# Patient Record
Sex: FEMALE | Race: OTHER | URBAN - METROPOLITAN AREA
[De-identification: names, ages, dates, MRNs, and addresses within clinical notes are randomized per-mention and may not be internally consistent; named-entity substitution may affect disease eponyms.]

---

## 2023-05-02 ENCOUNTER — PREPPED CHART (OUTPATIENT)
Dept: URBAN - METROPOLITAN AREA CLINIC 52 | Facility: CLINIC | Age: 73
End: 2023-05-02

## 2023-05-11 ENCOUNTER — NEW PATIENT (OUTPATIENT)
Dept: URBAN - METROPOLITAN AREA CLINIC 52 | Facility: CLINIC | Age: 73
End: 2023-05-11

## 2023-05-11 DIAGNOSIS — H35.033: ICD-10-CM

## 2023-05-11 DIAGNOSIS — H43.813: ICD-10-CM

## 2023-05-11 DIAGNOSIS — H26.492: ICD-10-CM

## 2023-05-11 DIAGNOSIS — H35.372: ICD-10-CM

## 2023-05-11 PROCEDURE — 92004 COMPRE OPH EXAM NEW PT 1/>: CPT

## 2023-05-11 PROCEDURE — 92134 CPTRZ OPH DX IMG PST SGM RTA: CPT

## 2023-05-11 PROCEDURE — 66821 AFTER CATARACT LASER SURGERY: CPT

## 2023-05-11 RX ORDER — PREDNISOLONE ACETATE 10 MG/ML: 1 SUSPENSION/ DROPS OPHTHALMIC TWICE A DAY

## 2023-05-11 ASSESSMENT — VISUAL ACUITY
OU_SC: 20/40
OD_CC: J1
OD_CC: 20/25
OS_SC: J20
OD_SC: 20/40
OS_CC: 20/60-1
OU_CC: 20/25
OS_SC: 20/60-2
OD_GLARE: 20/25
OS_CC: J1
OD_SC: J20
OU_SC: J20
OU_CC: J1
OD_GLARE: 20/30

## 2023-05-11 ASSESSMENT — TONOMETRY
OD_IOP_MMHG: 13
OS_IOP_MMHG: 13

## 2023-05-11 ASSESSMENT — KERATOMETRY
OD_AXISANGLE_DEGREES: 173
OD_AXISANGLE2_DEGREES: 83
OD_K1POWER_DIOPTERS: 42.50
OD_K2POWER_DIOPTERS: 42.75

## 2023-06-08 ENCOUNTER — POST-OP (OUTPATIENT)
Dept: URBAN - METROPOLITAN AREA CLINIC 52 | Facility: CLINIC | Age: 73
End: 2023-06-08

## 2023-06-08 DIAGNOSIS — Z98.890: ICD-10-CM

## 2023-06-08 PROCEDURE — 99024 POSTOP FOLLOW-UP VISIT: CPT

## 2023-06-08 PROCEDURE — 92015GRNC REFRACTION GLASSES RECHECK NO CHARGE

## 2023-06-08 ASSESSMENT — TONOMETRY
OD_IOP_MMHG: 12
OS_IOP_MMHG: 12

## 2023-06-08 ASSESSMENT — KERATOMETRY
OD_AXISANGLE2_DEGREES: 83
OD_K2POWER_DIOPTERS: 42.75
OD_K1POWER_DIOPTERS: 42.50
OD_AXISANGLE_DEGREES: 173

## 2023-06-08 ASSESSMENT — VISUAL ACUITY
OS_CC: 20/40
OS_PH: 20/40
OU_CC: J1+
OD_CC: 20/20

## 2023-06-22 ENCOUNTER — EMERGENCY VISIT (OUTPATIENT)
Dept: URBAN - METROPOLITAN AREA CLINIC 49 | Facility: CLINIC | Age: 73
End: 2023-06-22

## 2023-06-22 DIAGNOSIS — H35.372: ICD-10-CM

## 2023-06-22 PROCEDURE — 92134 CPTRZ OPH DX IMG PST SGM RTA: CPT

## 2023-06-22 PROCEDURE — 92012 INTRM OPH EXAM EST PATIENT: CPT

## 2023-06-22 ASSESSMENT — KERATOMETRY
OD_K1POWER_DIOPTERS: 42.50
OD_K2POWER_DIOPTERS: 42.75
OD_AXISANGLE2_DEGREES: 83
OD_AXISANGLE_DEGREES: 173

## 2023-06-22 ASSESSMENT — TONOMETRY
OD_IOP_MMHG: 12
OS_IOP_MMHG: 12

## 2023-06-22 ASSESSMENT — VISUAL ACUITY: OD_CC: 20/20-1

## 2025-08-03 NOTE — PROGRESS NOTES
"[facility]: [facility]  PROGRESS NOTE      Dionne Bautista is a 75 y.o. female that is presenting today fto establish care with new provider.  Would like to discuss joint issues and ozempic    Assessment/Plan   {Assess/Plan SmartLinks (Optional):81933}  Subjective   HPI  Review of Systems   Objective   There were no vitals filed for this visit.   There is no height or weight on file to calculate BMI.  Physical Exam  Diagnostic Results   No results found for: \"GLUCOSE\", \"CALCIUM\", \"NA\", \"K\", \"CO2\", \"CL\", \"BUN\", \"CREATININE\"  No results found for: \"ALT\", \"AST\", \"GGT\", \"ALKPHOS\", \"BILITOT\"  No results found for: \"WBC\", \"HGB\", \"HCT\", \"MCV\", \"PLT\"  No results found for: \"CHOL\"  No results found for: \"HDL\"  No results found for: \"LDLCALC\"  No results found for: \"TRIG\"  No components found for: \"CHOLHDL\"  No results found for: \"HGBA1C\"  Other labs not included in the list above were reviewed either before or during this encounter.    History    Medical History[1]  Surgical History[2]  Family History[3]  Social History     Socioeconomic History    Marital status:      Spouse name: Not on file    Number of children: Not on file    Years of education: Not on file    Highest education level: Not on file   Occupational History    Not on file   Tobacco Use    Smoking status: Not on file    Smokeless tobacco: Not on file   Substance and Sexual Activity    Alcohol use: Not on file    Drug use: Not on file    Sexual activity: Not on file   Other Topics Concern    Not on file   Social History Narrative    Not on file     Social Drivers of Health     Financial Resource Strain: Not on file   Food Insecurity: Not on file   Transportation Needs: Not on file   Physical Activity: Not on file   Stress: Not on file   Social Connections: Not on file   Intimate Partner Violence: Not on file   Housing Stability: Not on file     Allergies[4]  Medications Ordered Prior to Encounter[5]    There is no immunization " history on file for this patient.  Patient's medical history was reviewed and updated either before or during this encounter.       Radha Vigil, APRN-CNP       [1] No past medical history on file.  [2] No past surgical history on file.  [3] No family history on file.  [4] Not on File  [5]   No current outpatient medications on file prior to visit.     No current facility-administered medications on file prior to visit.

## 2025-08-04 ENCOUNTER — TELEPHONE (OUTPATIENT)
Dept: PRIMARY CARE | Facility: CLINIC | Age: 75
End: 2025-08-04
Payer: MEDICARE

## 2025-08-04 NOTE — TELEPHONE ENCOUNTER
LMOVM for pt to contact office directly to rs new pt appt. Appt booked by CS was not available for new patient appt.

## 2025-08-05 ENCOUNTER — OFFICE VISIT (OUTPATIENT)
Dept: PRIMARY CARE | Facility: CLINIC | Age: 75
End: 2025-08-05
Payer: MEDICARE

## 2025-08-05 ENCOUNTER — APPOINTMENT (OUTPATIENT)
Dept: PRIMARY CARE | Facility: CLINIC | Age: 75
End: 2025-08-05
Payer: MEDICARE

## 2025-08-05 ENCOUNTER — HOSPITAL ENCOUNTER (OUTPATIENT)
Dept: RADIOLOGY | Facility: CLINIC | Age: 75
Discharge: HOME | End: 2025-08-05
Payer: MEDICARE

## 2025-08-05 VITALS
SYSTOLIC BLOOD PRESSURE: 107 MMHG | TEMPERATURE: 97.8 F | HEART RATE: 58 BPM | WEIGHT: 164 LBS | DIASTOLIC BLOOD PRESSURE: 65 MMHG | BODY MASS INDEX: 26.36 KG/M2 | OXYGEN SATURATION: 96 % | HEIGHT: 66 IN

## 2025-08-05 DIAGNOSIS — F32.A DEPRESSION, UNSPECIFIED DEPRESSION TYPE: ICD-10-CM

## 2025-08-05 DIAGNOSIS — Z76.89 ENCOUNTER TO ESTABLISH CARE: Primary | ICD-10-CM

## 2025-08-05 DIAGNOSIS — M79.672 LEFT FOOT PAIN: ICD-10-CM

## 2025-08-05 DIAGNOSIS — M18.11 ARTHRITIS OF CARPOMETACARPAL (CMC) JOINT OF RIGHT THUMB: ICD-10-CM

## 2025-08-05 DIAGNOSIS — K21.9 GASTROESOPHAGEAL REFLUX DISEASE WITHOUT ESOPHAGITIS: ICD-10-CM

## 2025-08-05 DIAGNOSIS — F32.1 MAJOR DEPRESSIVE DISORDER, SINGLE EPISODE, MODERATE (MULTI): ICD-10-CM

## 2025-08-05 DIAGNOSIS — Z76.89 ENCOUNTER TO ESTABLISH CARE WITH NEW PROVIDER: Primary | ICD-10-CM

## 2025-08-05 DIAGNOSIS — J45.20 MILD INTERMITTENT ASTHMA WITHOUT COMPLICATION (HHS-HCC): ICD-10-CM

## 2025-08-05 DIAGNOSIS — I10 PRIMARY HYPERTENSION: ICD-10-CM

## 2025-08-05 DIAGNOSIS — R73.01 IMPAIRED FASTING GLUCOSE: ICD-10-CM

## 2025-08-05 DIAGNOSIS — E03.9 ACQUIRED HYPOTHYROIDISM: ICD-10-CM

## 2025-08-05 PROBLEM — J45.909 ASTHMA: Status: ACTIVE | Noted: 2021-08-06

## 2025-08-05 PROCEDURE — 1036F TOBACCO NON-USER: CPT | Performed by: NURSE PRACTITIONER

## 2025-08-05 PROCEDURE — 99214 OFFICE O/P EST MOD 30 MIN: CPT | Performed by: NURSE PRACTITIONER

## 2025-08-05 PROCEDURE — 73630 X-RAY EXAM OF FOOT: CPT | Mod: LEFT SIDE | Performed by: RADIOLOGY

## 2025-08-05 PROCEDURE — 73140 X-RAY EXAM OF FINGER(S): CPT | Mod: RT

## 2025-08-05 PROCEDURE — 3078F DIAST BP <80 MM HG: CPT | Performed by: NURSE PRACTITIONER

## 2025-08-05 PROCEDURE — 1159F MED LIST DOCD IN RCRD: CPT | Performed by: NURSE PRACTITIONER

## 2025-08-05 PROCEDURE — 73140 X-RAY EXAM OF FINGER(S): CPT | Mod: RIGHT SIDE | Performed by: RADIOLOGY

## 2025-08-05 PROCEDURE — 99204 OFFICE O/P NEW MOD 45 MIN: CPT | Performed by: NURSE PRACTITIONER

## 2025-08-05 PROCEDURE — 73630 X-RAY EXAM OF FOOT: CPT | Mod: LT

## 2025-08-05 PROCEDURE — 3074F SYST BP LT 130 MM HG: CPT | Performed by: NURSE PRACTITIONER

## 2025-08-05 PROCEDURE — 1125F AMNT PAIN NOTED PAIN PRSNT: CPT | Performed by: NURSE PRACTITIONER

## 2025-08-05 RX ORDER — ESOMEPRAZOLE MAGNESIUM 40 MG/1
40 CAPSULE, DELAYED RELEASE ORAL
COMMUNITY

## 2025-08-05 RX ORDER — VENLAFAXINE HYDROCHLORIDE 150 MG/1
TABLET, EXTENDED RELEASE ORAL
COMMUNITY

## 2025-08-05 RX ORDER — FLUTICASONE PROPIONATE 50 MCG
SPRAY, SUSPENSION (ML) NASAL
COMMUNITY
Start: 2024-12-11

## 2025-08-05 RX ORDER — ALBUTEROL SULFATE 90 UG/1
INHALANT RESPIRATORY (INHALATION)
COMMUNITY

## 2025-08-05 RX ORDER — AMLODIPINE BESYLATE 5 MG/1
5 TABLET ORAL DAILY
COMMUNITY

## 2025-08-05 RX ORDER — HYDROCHLOROTHIAZIDE 25 MG/1
25 TABLET ORAL
COMMUNITY

## 2025-08-05 RX ORDER — VALACYCLOVIR HYDROCHLORIDE 500 MG/1
500 TABLET, FILM COATED ORAL DAILY
COMMUNITY

## 2025-08-05 RX ORDER — LEVOTHYROXINE, LIOTHYRONINE 19; 4.5 UG/1; UG/1
TABLET ORAL
COMMUNITY

## 2025-08-05 RX ORDER — MONTELUKAST SODIUM 5 MG/1
TABLET, CHEWABLE ORAL
COMMUNITY
Start: 2025-05-22

## 2025-08-05 RX ORDER — METOPROLOL SUCCINATE 100 MG/1
100 TABLET, EXTENDED RELEASE ORAL DAILY
COMMUNITY

## 2025-08-05 RX ORDER — BUDESONIDE AND FORMOTEROL FUMARATE DIHYDRATE 160; 4.5 UG/1; UG/1
AEROSOL RESPIRATORY (INHALATION) EVERY 12 HOURS
COMMUNITY

## 2025-08-05 RX ORDER — ROSUVASTATIN CALCIUM 10 MG/1
TABLET, COATED ORAL EVERY 24 HOURS
COMMUNITY
End: 2025-08-05 | Stop reason: ALTCHOICE

## 2025-08-05 RX ORDER — VENLAFAXINE HYDROCHLORIDE 75 MG/1
75 CAPSULE, EXTENDED RELEASE ORAL DAILY
Qty: 30 CAPSULE | Refills: 1 | Status: SHIPPED | OUTPATIENT
Start: 2025-08-05 | End: 2025-10-04

## 2025-08-05 RX ORDER — METFORMIN HYDROCHLORIDE 500 MG/1
TABLET ORAL EVERY 12 HOURS
COMMUNITY
End: 2025-08-05 | Stop reason: ALTCHOICE

## 2025-08-05 RX ORDER — BLOOD SUGAR DIAGNOSTIC
STRIP MISCELLANEOUS EVERY 8 HOURS
COMMUNITY
Start: 2024-06-10

## 2025-08-05 ASSESSMENT — ENCOUNTER SYMPTOMS
CONSTITUTIONAL NEGATIVE: 1
JOINT SWELLING: 1
CARDIOVASCULAR NEGATIVE: 1
RESPIRATORY NEGATIVE: 1
NEUROLOGICAL NEGATIVE: 1

## 2025-08-05 ASSESSMENT — PATIENT HEALTH QUESTIONNAIRE - PHQ9
SUM OF ALL RESPONSES TO PHQ9 QUESTIONS 1 AND 2: 0
1. LITTLE INTEREST OR PLEASURE IN DOING THINGS: NOT AT ALL
2. FEELING DOWN, DEPRESSED OR HOPELESS: NOT AT ALL
SUM OF ALL RESPONSES TO PHQ9 QUESTIONS 1 AND 2: 0
1. LITTLE INTEREST OR PLEASURE IN DOING THINGS: NOT AT ALL
2. FEELING DOWN, DEPRESSED OR HOPELESS: NOT AT ALL

## 2025-08-05 ASSESSMENT — PAIN SCALES - GENERAL: PAINLEVEL_OUTOF10: 7

## 2025-08-05 NOTE — ASSESSMENT & PLAN NOTE
Managed with venlafaxine 150mg daily  Will increase velafaxine to 225mg daily  Follow up in 6 weeks

## 2025-08-05 NOTE — ASSESSMENT & PLAN NOTE
Continue tylenol or motrin  Will notify patient of results  Orders:    XR foot left 1-2 views; Future     Size Of Lesion In Cm (Optional): 0.3 Detail Level: Detailed X Size Of Lesion In Cm (Optional): 0.5 Size Of Lesion In Cm (Optional): 0

## 2025-08-05 NOTE — ASSESSMENT & PLAN NOTE
Managed with amlodipine 5mg daily  Managed with hydrochlorothiazide 25mg daily  Managed with metoprolol xl 100mg daily  Bp stable

## 2025-08-05 NOTE — ASSESSMENT & PLAN NOTE
Has tried multiple medications for depression  Currently on venlafaxine and reports it is not working  Orders:    venlafaxine XR (Effexor-XR) 75 mg 24 hr capsule; Take 1 capsule (75 mg) by mouth once daily. Take with food.

## 2025-08-05 NOTE — ASSESSMENT & PLAN NOTE
Managed with albuterol inhaler  Managed with montelukast 5mg daily  Managed withg symbicort q12h  Stable no flares

## 2025-08-05 NOTE — PROGRESS NOTES
[facility]: [facility]  PROGRESS NOTE      Dionne Bautista is a 75 y.o. female that is presenting today fto establish care with new provider.  Would like to discuss joint issues and ozempic.  She last saw a pcp in [place] Dr. Tiffany Tinajero.   Is looking for another provider in [facility].  She has multiple concerns today.  She has swelling and pain of her right thumb.  States her thumb pops at times.  Does not get stuck.  Has pain at times - takes motrin which helps.  Has pain with movement at times.  Also c/o of pain at the base of her 2nd and 3rd toes on her left foot.  Unable to wear flip flops d/t the discomfort.  Denies any difficulty walking.  Also states her depression is getting worse - currently on venlafaxine but feels it is not working.  Reports trying other antidepressants but does not remember them.      Assessment/Plan   Assessment & Plan  Encounter to establish care with new provider  Medications and problem list reconciled today  Last mammogram 30 years ago - does not do mammograms  Does not want colorectal screening  Declined bone density  Will get a copy of recent lab work       Primary hypertension  Managed with amlodipine 5mg daily  Managed with hydrochlorothiazide 25mg daily  Managed with metoprolol xl 100mg daily  Bp stable      Major depressive disorder, single episode, moderate (Multi)  Managed with venlafaxine 150mg daily  Will increase velafaxine to 225mg daily  Follow up in 6 weeks       Mild intermittent asthma without complication ([facility]-HCC)  Managed with albuterol inhaler  Managed with montelukast 5mg daily  Managed withg symbicort q12h  Stable no flares       Acquired hypothyroidism  Managed with NP thyroid 30mg daily       Gastroesophageal reflux disease without esophagitis  Managed with Nexium 40mg       Impaired fasting glucose  Was unable to tolerate metformin       Arthritis of carpometacarpal (CMC) joint of right thumb  Referral to ortho hand       Depression,  "unspecified depression type  Has tried multiple medications for depression  Currently on venlafaxine and reports it is not working  Orders:    venlafaxine XR (Effexor-XR) 75 mg 24 hr capsule; Take 1 capsule (75 mg) by mouth once daily. Take with food.    Left foot pain  Continue tylenol or motrin  Will notify patient of results  Orders:    XR foot left 1-2 views; Future      Subjective   HPI  Review of Systems   Constitutional: Negative.    Respiratory: Negative.     Cardiovascular: Negative.    Musculoskeletal:  Positive for joint swelling.        See hpi for details  Right thumb and left foot pain   Skin:         Peeling skin on fingers   Neurological: Negative.    Psychiatric/Behavioral:          Depression      Objective   Vitals:    08/05/25 1344   BP: 107/65   Pulse: 58   Temp: 36.6 °C (97.8 °F)   SpO2: 96%      Body mass index is 26.47 kg/m².  Physical Exam  Vitals reviewed.   Constitutional:       Appearance: Normal appearance.     Cardiovascular:      Rate and Rhythm: Normal rate and regular rhythm.      Heart sounds: Normal heart sounds.   Pulmonary:      Breath sounds: Normal breath sounds.   Abdominal:      General: Bowel sounds are normal.      Palpations: Abdomen is soft.     Musculoskeletal:      Comments: Swelling and popping of right thumb joint  No pain with palpation to left foot no neuroma     Neurological:      General: No focal deficit present.      Mental Status: She is alert and oriented to person, place, and time.     Psychiatric:         Mood and Affect: Mood normal.         Behavior: Behavior normal.         Thought Content: Thought content normal.         Judgment: Judgment normal.       Diagnostic Results   No results found for: \"GLUCOSE\", \"CALCIUM\", \"NA\", \"K\", \"CO2\", \"CL\", \"BUN\", \"CREATININE\"  No results found for: \"ALT\", \"AST\", \"GGT\", \"ALKPHOS\", \"BILITOT\"  No results found for: \"WBC\", \"HGB\", \"HCT\", \"MCV\", \"PLT\"  No results found for: \"CHOL\"  No results found for: \"HDL\"  No results " "found for: \"LDLCALC\"  No results found for: \"TRIG\"  No components found for: \"CHOLHDL\"  No results found for: \"HGBA1C\"  Other labs not included in the list above were reviewed either before or during this encounter.    History    Medical History[1]  Surgical History[2]  Family History[3]  Social History     Socioeconomic History    Marital status:      Spouse name: Not on file    Number of children: Not on file    Years of education: Not on file    Highest education level: Not on file   Occupational History    Not on file   Tobacco Use    Smoking status: Never     Passive exposure: Never    Smokeless tobacco: Never   Vaping Use    Vaping status: Never Used   Substance and Sexual Activity    Alcohol use: Yes     Comment: social    Drug use: Never    Sexual activity: Yes     Partners: Male     Birth control/protection: Male Sterilization, Female Sterilization   Other Topics Concern    Not on file   Social History Narrative    Not on file     Social Drivers of Health     Financial Resource Strain: Not on file   Food Insecurity: No Food Insecurity (1/11/2024)    Received from [facility] ([place], [place], [place], [place])    Food Insecurity     Food run out past 12 months: Not on file     Food did not last past 12 months: Not on file   Transportation Needs: Not on file   Physical Activity: Not on file   Stress: Not on file   Social Connections: Low Risk  (1/11/2024)    Received from [facility] ([place], [place], [place], [place])    Family and Community Support     Help with Day to Day Activities: Not on file     Feeling Lonely or Isolated: Not on file   Intimate Partner Violence: Not on file   Housing Stability: Not on file     Allergies[4]  Medications Ordered Prior to Encounter[5]  Immunization History   Administered Date(s) Administered    Covid-19 Non-us Vaccine, Product Unknown 03/17/2021, 04/07/2021     Patient's medical history was reviewed and updated either before or during this encounter.       Radha Vigil, APRN-CNP     "     [1]   Past Medical History:  Diagnosis Date    Allergic 1981    Arthritis 2010    Asthma 1981    Cataract Surgery 2014    Depression 1996    Diabetes mellitus (Multi) 2015    Disease of thyroid gland 1985    Hypertension 1983    Varicella 1957    Visual impairment 1959   [2]   Past Surgical History:  Procedure Laterality Date    EYE SURGERY  Cataracts and corrective lenses    HYSTERECTOMY  1997    SINUS SURGERY  Deviated septum 1974    TUBAL LIGATION  1986    WISDOM TOOTH EXTRACTION  1974   [3]   Family History  Problem Relation Name Age of Onset    Cancer Father  60 - 69    Hearing loss Father  50 - 59    Diabetes Maternal Grandmother  60 - 69    Breast cancer Maternal Grandmother  60 - 69    Diabetes Mother  60 - 69    Breast cancer Mother  70 - 79    Breast cancer Paternal Grandmother  50 - 59   [4]   Allergies  Allergen Reactions    Amoxicillin-Pot Clavulanate Unknown    Multivitamin-Minerals-Lutein Unknown   [5]   Current Outpatient Medications on File Prior to Visit   Medication Sig Dispense Refill    albuterol 90 mcg/actuation inhaler INHALE 1 PUFF AS NEEDED INHALATION EVERY 6 HOURS AS NEEDED 90 DAYS      amLODIPine (Norvasc) 5 mg tablet Take 1 tablet (5 mg) by mouth once daily.      budesonide-formoterol (Symbicort) 160-4.5 mcg/actuation inhaler every 12 hours.      esomeprazole (NexIUM) 40 mg DR capsule Take 1 capsule (40 mg) by mouth.      fluticasone (Flonase) 50 mcg/actuation nasal spray 1 SPRAY INTO INTO EACH NOSTRIL TWICE A DAY      hydroCHLOROthiazide (HYDRODiuril) 25 mg tablet Take 1 tablet (25 mg) by mouth.      metoprolol succinate XL (Toprol-XL) 100 mg 24 hr tablet Take 1 tablet (100 mg) by mouth once daily.      montelukast (Singulair) 5 mg chewable tablet TAKE 1 TABLET AS DIRECTED ORALLY ONCE DAILY      NON FORMULARY GLUCOVEN for blood sugar      NP Thyroid 30 mg tablet TAKE 1 TABLET BY MOUTH TWICE A DAY ON EMPTY STOMACH      OneTouch Ultra Test every 8 hours.      valACYclovir (Valtrex) 500  mg tablet Take 1 tablet (500 mg) by mouth once daily.      venlafaxine 150 mg 24 hr tablet take 1 tablet by mouth every day with food for 90 days      VITAMIN B COMPLEX ORAL Take 1 tablet by mouth once daily.      [DISCONTINUED] metFORMIN (Glucophage) 500 mg tablet every 12 hours.      [DISCONTINUED] rosuvastatin (Crestor) 10 mg tablet once every 24 hours.       No current facility-administered medications on file prior to visit.

## 2025-08-27 DIAGNOSIS — F32.A DEPRESSION, UNSPECIFIED DEPRESSION TYPE: ICD-10-CM

## 2025-08-28 RX ORDER — VENLAFAXINE HYDROCHLORIDE 75 MG/1
75 CAPSULE, EXTENDED RELEASE ORAL DAILY
Qty: 90 CAPSULE | Refills: 1 | Status: SHIPPED | OUTPATIENT
Start: 2025-08-28 | End: 2025-10-27